# Patient Record
Sex: FEMALE | Race: WHITE
[De-identification: names, ages, dates, MRNs, and addresses within clinical notes are randomized per-mention and may not be internally consistent; named-entity substitution may affect disease eponyms.]

---

## 2017-01-20 ENCOUNTER — HOSPITAL ENCOUNTER (OUTPATIENT)
Dept: HOSPITAL 35 - CAT | Age: 57
End: 2017-01-20
Attending: FAMILY MEDICINE
Payer: COMMERCIAL

## 2017-01-20 DIAGNOSIS — R41.0: Primary | ICD-10-CM

## 2018-01-17 ENCOUNTER — HOSPITAL ENCOUNTER (OUTPATIENT)
Dept: HOSPITAL 35 - RAD | Age: 58
End: 2018-01-17
Attending: FAMILY MEDICINE
Payer: COMMERCIAL

## 2018-01-17 DIAGNOSIS — R05: Primary | ICD-10-CM

## 2018-03-01 ENCOUNTER — HOSPITAL ENCOUNTER (INPATIENT)
Dept: HOSPITAL 96 - M.ERS | Age: 58
LOS: 8 days | Discharge: HOME | DRG: 871 | End: 2018-03-09
Attending: INTERNAL MEDICINE | Admitting: INTERNAL MEDICINE
Payer: COMMERCIAL

## 2018-03-01 VITALS — HEIGHT: 62.99 IN | BODY MASS INDEX: 25.23 KG/M2 | WEIGHT: 142.4 LBS

## 2018-03-01 VITALS — SYSTOLIC BLOOD PRESSURE: 96 MMHG | DIASTOLIC BLOOD PRESSURE: 69 MMHG

## 2018-03-01 VITALS — DIASTOLIC BLOOD PRESSURE: 69 MMHG | SYSTOLIC BLOOD PRESSURE: 117 MMHG

## 2018-03-01 VITALS — DIASTOLIC BLOOD PRESSURE: 77 MMHG | SYSTOLIC BLOOD PRESSURE: 114 MMHG

## 2018-03-01 DIAGNOSIS — Z23: ICD-10-CM

## 2018-03-01 DIAGNOSIS — J44.0: ICD-10-CM

## 2018-03-01 DIAGNOSIS — R91.8: ICD-10-CM

## 2018-03-01 DIAGNOSIS — J96.01: ICD-10-CM

## 2018-03-01 DIAGNOSIS — J44.1: ICD-10-CM

## 2018-03-01 DIAGNOSIS — A41.9: Primary | ICD-10-CM

## 2018-03-01 DIAGNOSIS — Z98.891: ICD-10-CM

## 2018-03-01 DIAGNOSIS — Z88.0: ICD-10-CM

## 2018-03-01 DIAGNOSIS — E46: ICD-10-CM

## 2018-03-01 DIAGNOSIS — Z99.81: ICD-10-CM

## 2018-03-01 DIAGNOSIS — J18.9: ICD-10-CM

## 2018-03-01 DIAGNOSIS — F32.9: ICD-10-CM

## 2018-03-01 DIAGNOSIS — Z87.891: ICD-10-CM

## 2018-03-01 DIAGNOSIS — J10.00: ICD-10-CM

## 2018-03-01 LAB
ABSOLUTE MONOCYTES: 0.8 THOU/UL (ref 0–1.2)
ALBUMIN SERPL-MCNC: 2.9 G/DL (ref 3.4–5)
ALP SERPL-CCNC: 101 U/L (ref 46–116)
ALT SERPL-CCNC: 16 U/L (ref 30–65)
ANION GAP SERPL CALC-SCNC: 11 MMOL/L (ref 7–16)
AST SERPL-CCNC: 13 U/L (ref 15–37)
BE: -2.8 MMOL/L
BILIRUB SERPL-MCNC: 0.3 MG/DL
BILIRUB UR-MCNC: NEGATIVE MG/DL
BUN SERPL-MCNC: 31 MG/DL (ref 7–18)
CALCIUM SERPL-MCNC: 9.6 MG/DL (ref 8.5–10.1)
CHLORIDE SERPL-SCNC: 98 MMOL/L (ref 98–107)
CO2 SERPL-SCNC: 28 MMOL/L (ref 21–32)
COLOR UR: YELLOW
CREAT SERPL-MCNC: 1.3 MG/DL (ref 0.6–1.3)
GLUCOSE SERPL-MCNC: 125 MG/DL (ref 70–99)
GRANULOCYTES NFR BLD MANUAL: 92 %
HCO3 BLD-SCNC: 23 MMOL/L (ref 22–26)
HCT VFR BLD CALC: 39.3 % (ref 37–47)
HGB BLD-MCNC: 12.8 GM/DL (ref 12–15)
KETONES UR STRIP-MCNC: NEGATIVE MG/DL
LYMPHOCYTES # BLD: 0.3 THOU/UL (ref 0.8–5.3)
LYMPHOCYTES NFR BLD AUTO: 2 %
MCH RBC QN AUTO: 30.7 PG (ref 26–34)
MCHC RBC AUTO-ENTMCNC: 32.5 G/DL (ref 28–37)
MCV RBC: 94.3 FL (ref 80–100)
MONOCYTES NFR BLD: 5 %
MPV: 7.5 FL. (ref 7.2–11.1)
NEUTROPHILS # BLD: 14.5 THOU/UL (ref 1.6–8.1)
NEUTS BAND NFR BLD: 1 %
NT-PRO BRAIN NAT PEPTIDE: 1233 PG/ML (ref ?–300)
NUCLEATED RBCS: 0 /100WBC
PCO2 BLD: 44.1 MMHG (ref 35–45)
PLATELET # BLD EST: ADEQUATE 10*3/UL
PLATELET COUNT*: 402 THOU/UL (ref 150–400)
PO2 BLD: 84 MMHG (ref 75–100)
POTASSIUM SERPL-SCNC: 4.6 MMOL/L (ref 3.5–5.1)
PROT SERPL-MCNC: 8.1 G/DL (ref 6.4–8.2)
PROT UR QL STRIP: NEGATIVE
RBC # BLD AUTO: 4.17 MIL/UL (ref 4.2–5)
RBC # UR STRIP: NEGATIVE /UL
RBC MORPH BLD: NORMAL
RDW-CV: 14.5 % (ref 10.5–14.5)
SODIUM SERPL-SCNC: 137 MMOL/L (ref 136–145)
SP GR UR STRIP: 1.02 (ref 1–1.03)
TROPONIN-I LEVEL: 0.36 NG/ML (ref ?–0.06)
URINE CLARITY: CLEAR
URINE GLUCOSE-RANDOM: NEGATIVE
URINE LEUKOCYTES-REFLEX: NEGATIVE
URINE NITRITE-REFLEX: NEGATIVE
UROBILINOGEN UR STRIP-ACNC: 0.2 E.U./DL (ref 0.2–1)
WBC # BLD AUTO: 15.6 THOU/UL (ref 4–11)

## 2018-03-02 VITALS — DIASTOLIC BLOOD PRESSURE: 73 MMHG | SYSTOLIC BLOOD PRESSURE: 105 MMHG

## 2018-03-02 VITALS — SYSTOLIC BLOOD PRESSURE: 118 MMHG | DIASTOLIC BLOOD PRESSURE: 67 MMHG

## 2018-03-02 VITALS — SYSTOLIC BLOOD PRESSURE: 101 MMHG | DIASTOLIC BLOOD PRESSURE: 68 MMHG

## 2018-03-02 VITALS — DIASTOLIC BLOOD PRESSURE: 59 MMHG | SYSTOLIC BLOOD PRESSURE: 85 MMHG

## 2018-03-02 VITALS — DIASTOLIC BLOOD PRESSURE: 60 MMHG | SYSTOLIC BLOOD PRESSURE: 90 MMHG

## 2018-03-02 VITALS — SYSTOLIC BLOOD PRESSURE: 103 MMHG | DIASTOLIC BLOOD PRESSURE: 60 MMHG

## 2018-03-02 LAB
ABSOLUTE BASOPHILS: 0 THOU/UL (ref 0–0.2)
ABSOLUTE EOSINOPHILS: 0 THOU/UL (ref 0–0.7)
ABSOLUTE MONOCYTES: 0.4 THOU/UL (ref 0–1.2)
ANION GAP SERPL CALC-SCNC: 9 MMOL/L (ref 7–16)
BASOPHILS NFR BLD AUTO: 0.1 %
BUN SERPL-MCNC: 35 MG/DL (ref 7–18)
CALCIUM SERPL-MCNC: 9.5 MG/DL (ref 8.5–10.1)
CHLORIDE SERPL-SCNC: 98 MMOL/L (ref 98–107)
CO2 SERPL-SCNC: 30 MMOL/L (ref 21–32)
CREAT SERPL-MCNC: 1.3 MG/DL (ref 0.6–1.3)
EOSINOPHIL NFR BLD: 0 %
GLUCOSE SERPL-MCNC: 171 MG/DL (ref 70–99)
GRANULOCYTES NFR BLD MANUAL: 94.3 %
HCT VFR BLD CALC: 37.2 % (ref 37–47)
HGB BLD-MCNC: 11.8 GM/DL (ref 12–15)
LYMPHOCYTES # BLD: 0.3 THOU/UL (ref 0.8–5.3)
LYMPHOCYTES NFR BLD AUTO: 2.3 %
MCH RBC QN AUTO: 30.2 PG (ref 26–34)
MCHC RBC AUTO-ENTMCNC: 31.8 G/DL (ref 28–37)
MCV RBC: 94.8 FL (ref 80–100)
MONOCYTES NFR BLD: 3.3 %
MPV: 7.8 FL. (ref 7.2–11.1)
NEUTROPHILS # BLD: 10.8 THOU/UL (ref 1.6–8.1)
NT-PRO BRAIN NAT PEPTIDE: 1537 PG/ML (ref ?–300)
NUCLEATED RBCS: 0 /100WBC
PLATELET COUNT*: 336 THOU/UL (ref 150–400)
POTASSIUM SERPL-SCNC: 4.4 MMOL/L (ref 3.5–5.1)
RBC # BLD AUTO: 3.92 MIL/UL (ref 4.2–5)
RDW-CV: 14.5 % (ref 10.5–14.5)
SODIUM SERPL-SCNC: 137 MMOL/L (ref 136–145)
WBC # BLD AUTO: 11.4 THOU/UL (ref 4–11)

## 2018-03-02 PROCEDURE — B24BZZ4 ULTRASONOGRAPHY OF HEART WITH AORTA, TRANSESOPHAGEAL: ICD-10-PCS | Performed by: INTERNAL MEDICINE

## 2018-03-02 NOTE — 2DMMODE
[place] [address]
Phone:  [phone] 2 D/M-MODE ECHOCARDIOGRAM     
_______________________________________________________________________________
 
Name:         DC WEINBERG                  Room:          [address] [place] 
[facility]#:    [id number]     Account #:     [id number]  
Admission:    18    Attend Phys:   Umair Hamilton, 
Discharge:                Date of Birth: [birth year]  
Date of Service: 18 1700  Report #:      [id number]
        [id number]
_______________________________________________________________________________
THIS REPORT FOR:  //name//                      
 
 
--------------- APPROVED REPORT --------------
 
 
Study performed:  2018 16:28:34
 
EXAM: Comprehensive 2D, Doppler, and color-flow 
Echocardiogram 
Patient Location: In-Patient   
Room #:  [facility]     Status:  routine
 
      BSA:         1.54
HR: 77 bpm BP:          105/73 mmHg 
Rhythm: NSR     
 
Other Information 
Study Quality: Good
 
Indications
COPD
Elevated Troponin
 
2D Dimensions
   LVEF(%):  67.11 (&gt;50%)
IVSd:  10.16 (7-11mm) LVOT Diam:  19.53 (18-24mm) 
LVDd:  43.25 mm  
PWd:  9.52 (7-11mm) Ascending Ao:  28.89 (22-36mm)
LVDs:  27.27 (25-40mm) 
Aortic Root:  28.05 mm 
   [place]'s LVEF:  67.11 %
 
Volumes
Left Atrial Volume (Systole) 
    LA ESV Index:  25.20 mL/m2
 
Aortic Valve
AoV Peak Ujdah.:  1.42 m/s 
AO Peak Gr.:  8.11 mmHg  LVOT Max P.36 mmHg
AO Mean Gr.:  4.52 mmHg  LVOT Mean P.50 mmHg
    LVOT Max V:  1.16 m/s
AO V2 VTI:  20.79 cm  LVOT Mean V:  0.72 m/s
ALECIA (VTI):  2.77 cm2  LVOT V1 VTI:  19.20 cm
 
Mitral Valve
 
 
[place] [address]
Phone:  [phone]                     2 D/M-MODE ECHOCARDIOGRAM     
_______________________________________________________________________________
 
Name:         DC WEINBERG                  Room:          [address] [place] 
.R.#:    [id number]     Account #:     [id number]  
Admission:    18    Attend Phys:   Umair Hamilton, 
Discharge:                Date of Birth: [birth year]  
Date of Service: 18 1700  Report #:      [id number]
        [id number]
_______________________________________________________________________________
    E/A Ratio:  0.78
    MV Decel. Time:  215.48 ms
MV E Max Judah.:  0.74 m/s 
MV PHT:  62.49 ms  
MVA (PHT):  3.52 cm2  
 
TDI
E/Lateral E':  7.40 E/Medial E':  6.73
   Medial E' Judah.:  0.11 m/s
   Lateral E' Judah.:  0.10 m/s
 
Pulmonary Valve
PV Peak Judah.:  1.21 m/s PV Peak Gr.:  5.89 mmHg
 
Left Ventricle
The left ventricle is normal size. There is normal LV segmental wall 
motion. There is normal left ventricular wall thickness. Left 
ventricular systolic function is normal. The left ventricular 
ejection fraction is within the normal range. LVEF is 55-60%. Grade I 
- abnormal relaxation pattern.
 
Right Ventricle
The right ventricle is normal size. The right ventricular systolic 
function is normal.
 
Atria
The left atrium size is normal. The right atrium size is 
normal.
 
Aortic Valve
The aortic valve is normal in structure. No aortic regurgitation is 
present. There is no aortic valvular stenosis.
 
Mitral Valve
The mitral valve is normal in structure. There is no mitral valve 
regurgitation noted. No evidence of mitral valve stenosis.
 
Tricuspid Valve
The tricuspid valve is normal in structure. Unable to assess PA 
pressure. Trace tricuspid regurgitation.
 
Pulmonic Valve
The pulmonary valve is normal in structure. There is no pulmonic 
valvular regurgitation.
 
[facility]
 
 
[place] [address]
Phone:  [phone]                     2 D/M-MODE ECHOCARDIOGRAM     
_______________________________________________________________________________
 
Name:         DC WEINBERG                  Room:          [address]#:    [id number]     Account #:     [id number]  
Admission:    18    Attend Phys:   Umair Hamilton, 
Discharge:                Date of Birth: [birth year]  
Date of Service: 18 1700  Report #:      [id number]
        [id number]
_______________________________________________________________________________
The aortic root is normal in size. IVC is normal in size and 
collapses with &gt;50% inspiration
 
Pericardium
There is no pericardial effusion.
 
&lt;Conclusion&gt;
The left ventricle is normal size.
There is normal left ventricular wall thickness.
Left ventricular systolic function is normal.
The left ventricular ejection fraction is within the normal 
range.
LVEF is 55-60%.
Grade I - abnormal relaxation pattern.
The right ventricle is normal size.
The left atrium size is normal.
The aortic valve is normal in structure.
The mitral valve is normal in structure.
The tricuspid valve is normal in structure.
IVC is normal in size and collapses with &gt;50% inspiration
There is no pericardial effusion.
There is normal LV segmental wall motion.
 
 
 
 
 
 
 
 
 
 
 
 
 
 
 
 
 
 
 
 
 
 
  <ELECTRONICALLY SIGNED>
                                           By: Isaac Hoffmann MD, FACC     
  18
D: 18   _____________________________________
T: 18   Isaac Hoffmann MD, FACC       /INF

## 2018-03-02 NOTE — EKG
[place] [address]
Phone:  [phone]                     ELECTROCARDIOGRAM REPORT      
_______________________________________________________________________________
 
Name:       DC WEINBERG                   Room:           [address]    ADM [place]  
M.R.#:  [id number]      Account #:      [id number]  
Admission:  18     Attend Phys:    Umair Hamilton MD 
Discharge:               Date of Birth:  [birth year]  
         Report #: [id number]
    [id number]
_______________________________________________________________________________
THIS REPORT FOR:  //name//                      
 
                         [facility] ED
                                       
Test Date:    2018               Test Time:    16:35:17
Pat Name:     DC WEINBERG             Department:   
Patient ID:   [id number]            Room:         [facility]
Gender:       F                        Technician:   KRISTEL STEVENS
:          1960               Requested By: Stephan Zhang
Order Number: [id number] MD:   Isaac Hoffmann
                                 Measurements
Intervals                              Axis          
Rate:         99                       P:            80
PA:           120                      QRS:          -46
QRSD:         81                       T:            75
QT:           330                                    
QTc:          424                                    
                           Interpretive Statements
Sinus rhythm
Left anterior fascicular block
No previous ECG available for comparison
 
Electronically Signed On 3-2-2018 17:23:39 CST by Isaac Hoffmann
[email or web address]
 
 
 
 
 
 
 
 
 
 
 
 
 
 
 
 
 
 
 
  <ELECTRONICALLY SIGNED>
                                           By: Isaac Hoffmann MD, [facility]     
  18     1723
D: 18 1635   _____________________________________
T: 18 163   Isaac Hoffmann MD, FACC       /EPI

## 2018-03-03 VITALS — DIASTOLIC BLOOD PRESSURE: 75 MMHG | SYSTOLIC BLOOD PRESSURE: 108 MMHG

## 2018-03-03 VITALS — SYSTOLIC BLOOD PRESSURE: 117 MMHG | DIASTOLIC BLOOD PRESSURE: 63 MMHG

## 2018-03-03 VITALS — DIASTOLIC BLOOD PRESSURE: 60 MMHG | SYSTOLIC BLOOD PRESSURE: 92 MMHG

## 2018-03-03 VITALS — SYSTOLIC BLOOD PRESSURE: 101 MMHG | DIASTOLIC BLOOD PRESSURE: 65 MMHG

## 2018-03-03 LAB
ABSOLUTE BASOPHILS: 0 THOU/UL (ref 0–0.2)
ABSOLUTE EOSINOPHILS: 0 THOU/UL (ref 0–0.7)
ABSOLUTE MONOCYTES: 0.7 THOU/UL (ref 0–1.2)
ALBUMIN SERPL-MCNC: 2.6 G/DL (ref 3.4–5)
ALP SERPL-CCNC: 94 U/L (ref 46–116)
ALT SERPL-CCNC: 21 U/L (ref 30–65)
ANION GAP SERPL CALC-SCNC: 8 MMOL/L (ref 7–16)
AST SERPL-CCNC: 18 U/L (ref 15–37)
BASOPHILS NFR BLD AUTO: 0.2 %
BILIRUB SERPL-MCNC: 0.2 MG/DL
BUN SERPL-MCNC: 28 MG/DL (ref 7–18)
CALCIUM SERPL-MCNC: 9.7 MG/DL (ref 8.5–10.1)
CHLORIDE SERPL-SCNC: 102 MMOL/L (ref 98–107)
CO2 SERPL-SCNC: 30 MMOL/L (ref 21–32)
CREAT SERPL-MCNC: 0.9 MG/DL (ref 0.6–1.3)
EOSINOPHIL NFR BLD: 0.1 %
GLUCOSE SERPL-MCNC: 131 MG/DL (ref 70–99)
GRANULOCYTES NFR BLD MANUAL: 90.7 %
HCT VFR BLD CALC: 34.2 % (ref 37–47)
HGB BLD-MCNC: 11.1 GM/DL (ref 12–15)
LYMPHOCYTES # BLD: 0.5 THOU/UL (ref 0.8–5.3)
LYMPHOCYTES NFR BLD AUTO: 4 %
MCH RBC QN AUTO: 30.6 PG (ref 26–34)
MCHC RBC AUTO-ENTMCNC: 32.4 G/DL (ref 28–37)
MCV RBC: 94.3 FL (ref 80–100)
MONOCYTES NFR BLD: 5 %
MPV: 7.7 FL. (ref 7.2–11.1)
NEUTROPHILS # BLD: 12.2 THOU/UL (ref 1.6–8.1)
NUCLEATED RBCS: 0 /100WBC
PLATELET COUNT*: 329 THOU/UL (ref 150–400)
POTASSIUM SERPL-SCNC: 4.4 MMOL/L (ref 3.5–5.1)
PROT SERPL-MCNC: 6.3 G/DL (ref 6.4–8.2)
RBC # BLD AUTO: 3.62 MIL/UL (ref 4.2–5)
RDW-CV: 14.4 % (ref 10.5–14.5)
SODIUM SERPL-SCNC: 140 MMOL/L (ref 136–145)
TROPONIN-I LEVEL: <0.06 NG/ML (ref ?–0.06)
WBC # BLD AUTO: 13.4 THOU/UL (ref 4–11)

## 2018-03-04 VITALS — SYSTOLIC BLOOD PRESSURE: 111 MMHG | DIASTOLIC BLOOD PRESSURE: 76 MMHG

## 2018-03-04 VITALS — SYSTOLIC BLOOD PRESSURE: 110 MMHG | DIASTOLIC BLOOD PRESSURE: 67 MMHG

## 2018-03-04 VITALS — SYSTOLIC BLOOD PRESSURE: 129 MMHG | DIASTOLIC BLOOD PRESSURE: 81 MMHG

## 2018-03-04 VITALS — SYSTOLIC BLOOD PRESSURE: 110 MMHG | DIASTOLIC BLOOD PRESSURE: 61 MMHG

## 2018-03-04 VITALS — SYSTOLIC BLOOD PRESSURE: 117 MMHG | DIASTOLIC BLOOD PRESSURE: 79 MMHG

## 2018-03-04 LAB
ABSOLUTE BASOPHILS: 0 THOU/UL (ref 0–0.2)
ABSOLUTE EOSINOPHILS: 0 THOU/UL (ref 0–0.7)
ABSOLUTE MONOCYTES: 0.7 THOU/UL (ref 0–1.2)
ALBUMIN SERPL-MCNC: 2.4 G/DL (ref 3.4–5)
ALP SERPL-CCNC: 89 U/L (ref 46–116)
ALT SERPL-CCNC: 25 U/L (ref 30–65)
ANION GAP SERPL CALC-SCNC: 10 MMOL/L (ref 7–16)
AST SERPL-CCNC: 19 U/L (ref 15–37)
BASOPHILS NFR BLD AUTO: 0.2 %
BILIRUB SERPL-MCNC: 0.2 MG/DL
BUN SERPL-MCNC: 21 MG/DL (ref 7–18)
CALCIUM SERPL-MCNC: 9.4 MG/DL (ref 8.5–10.1)
CHLORIDE SERPL-SCNC: 101 MMOL/L (ref 98–107)
CO2 SERPL-SCNC: 31 MMOL/L (ref 21–32)
CREAT SERPL-MCNC: 0.9 MG/DL (ref 0.6–1.3)
EOSINOPHIL NFR BLD: 0 %
GLUCOSE SERPL-MCNC: 141 MG/DL (ref 70–99)
GRANULOCYTES NFR BLD MANUAL: 89.6 %
HCT VFR BLD CALC: 34.1 % (ref 37–47)
HGB BLD-MCNC: 11 GM/DL (ref 12–15)
LYMPHOCYTES # BLD: 0.5 THOU/UL (ref 0.8–5.3)
LYMPHOCYTES NFR BLD AUTO: 4.5 %
MCH RBC QN AUTO: 30.3 PG (ref 26–34)
MCHC RBC AUTO-ENTMCNC: 32.4 G/DL (ref 28–37)
MCV RBC: 93.5 FL (ref 80–100)
MONOCYTES NFR BLD: 5.7 %
MPV: 7.4 FL. (ref 7.2–11.1)
NEUTROPHILS # BLD: 10.4 THOU/UL (ref 1.6–8.1)
NUCLEATED RBCS: 0 /100WBC
PLATELET COUNT*: 328 THOU/UL (ref 150–400)
POTASSIUM SERPL-SCNC: 4.3 MMOL/L (ref 3.5–5.1)
PROT SERPL-MCNC: 6.7 G/DL (ref 6.4–8.2)
RBC # BLD AUTO: 3.65 MIL/UL (ref 4.2–5)
RDW-CV: 14.4 % (ref 10.5–14.5)
SODIUM SERPL-SCNC: 142 MMOL/L (ref 136–145)
WBC # BLD AUTO: 11.7 THOU/UL (ref 4–11)

## 2018-03-05 VITALS — DIASTOLIC BLOOD PRESSURE: 83 MMHG | SYSTOLIC BLOOD PRESSURE: 135 MMHG

## 2018-03-05 VITALS — DIASTOLIC BLOOD PRESSURE: 77 MMHG | SYSTOLIC BLOOD PRESSURE: 125 MMHG

## 2018-03-05 VITALS — SYSTOLIC BLOOD PRESSURE: 126 MMHG | DIASTOLIC BLOOD PRESSURE: 72 MMHG

## 2018-03-05 LAB
ABSOLUTE MONOCYTES: 0.2 THOU/UL (ref 0–1.2)
ALBUMIN SERPL-MCNC: 2.3 G/DL (ref 3.4–5)
ALP SERPL-CCNC: 88 U/L (ref 46–116)
ALT SERPL-CCNC: 59 U/L (ref 30–65)
ANION GAP SERPL CALC-SCNC: 3 MMOL/L (ref 7–16)
ANISOCYTOSIS BLD QL SMEAR: (no result)
AST SERPL-CCNC: 40 U/L (ref 15–37)
BILIRUB SERPL-MCNC: 0.1 MG/DL
BUN SERPL-MCNC: 23 MG/DL (ref 7–18)
CALCIUM SERPL-MCNC: 9 MG/DL (ref 8.5–10.1)
CHLORIDE SERPL-SCNC: 106 MMOL/L (ref 98–107)
CO2 SERPL-SCNC: 33 MMOL/L (ref 21–32)
CREAT SERPL-MCNC: 0.8 MG/DL (ref 0.6–1.3)
GLUCOSE SERPL-MCNC: 134 MG/DL (ref 70–99)
GRANULOCYTES NFR BLD MANUAL: 83 %
HCT VFR BLD CALC: 33.5 % (ref 37–47)
HGB BLD-MCNC: 10.9 GM/DL (ref 12–15)
LYMPHOCYTES # BLD: 1.4 THOU/UL (ref 0.8–5.3)
LYMPHOCYTES NFR BLD AUTO: 13 %
MCH RBC QN AUTO: 30.3 PG (ref 26–34)
MCHC RBC AUTO-ENTMCNC: 32.7 G/DL (ref 28–37)
MCV RBC: 92.7 FL (ref 80–100)
MONOCYTES NFR BLD: 2 %
MPV: 7.2 FL. (ref 7.2–11.1)
NEUTROPHILS # BLD: 9.4 THOU/UL (ref 1.6–8.1)
NEUTS BAND NFR BLD: 2 %
NUCLEATED RBCS: 0 /100WBC
PLATELET # BLD EST: ADEQUATE 10*3/UL
PLATELET COUNT*: 306 THOU/UL (ref 150–400)
POIKILOCYTOSIS BLD QL SMEAR: (no result)
POTASSIUM SERPL-SCNC: 4.4 MMOL/L (ref 3.5–5.1)
PROT SERPL-MCNC: 6.1 G/DL (ref 6.4–8.2)
RBC # BLD AUTO: 3.61 MIL/UL (ref 4.2–5)
RDW-CV: 14.7 % (ref 10.5–14.5)
SODIUM SERPL-SCNC: 142 MMOL/L (ref 136–145)
WBC # BLD AUTO: 11.1 THOU/UL (ref 4–11)

## 2018-03-05 PROCEDURE — 5A09457 ASSISTANCE WITH RESPIRATORY VENTILATION, 24-96 CONSECUTIVE HOURS, CONTINUOUS POSITIVE AIRWAY PRESSURE: ICD-10-PCS | Performed by: INTERNAL MEDICINE

## 2018-03-05 NOTE — CON
[address]  [address]                    CONSULTATION                  
_______________________________________________________________________________
 
Name:       DC WEINBERG                   Room:           [address] [place]  
M.R.#:  [id number]      Account #:      [id number]  
Admission:  18     Attend Phys:    Umair Hamilton MD 
Discharge:               Date of Birth:  [birth year]  
         Report #: [id number]
                                                                     [id number]  
_______________________________________________________________________________
THIS REPORT FOR:  //name//                      
 
CC: Umair Santoro
 
DATE OF SERVICE:  2018
 
 
CONSULT REQUESTED BY:  Dr. Zheng.
 
INDICATION FOR CONSULTATION:  Lung nodule.  The patient also has a history of
COPD exacerbation and pulmonary infiltrates.
 
HISTORY OF PRESENT ILLNESS:  A 57-year-old female with known history of COPD,
not on oxygen, not on prednisone long term, presented with increased shortness
of breath, cough, thick greenish sputum, which has now turned yellow.  There is
also elevation in troponin I up to 0.36 initially.  The patient since then has
been treated with Levaquin as well as Solu-Medrol.  Symptomatically, she is
better, but still has significant shortness of breath, still has thick mucus. 
She had some vague chest discomfort with respiration, which is better.  Answered
to the negative for all other questions asked for review of systems.
 
PAST MEDICAL HISTORY:  COPD, previous PFTs not available, depression. She has
had a heavy alcohol use in the past, , left hand surgery.  The patient
had a recent echocardiogram, shows a left ventricular ejection fraction of
55-60%.  The pulmonary artery systolic was not reported to be elevated.
 
SOCIAL HISTORY:  Smoker, extensive history, discontinued 2 years ago.  Also,
previous history of heavy alcohol use.  Unable to quantify the time.  No known
history of illegal drug use.
 
CURRENT MEDICATIONS:  List in [facility] Reviewed.
 
ALLERGIES:  PENICILLINS.
 
PHYSICAL EXAMINATION:
GENERAL:  Alert, awake and oriented.
VITAL SIGNS:  Vitals in the records reviewed.
THROAT:  No erythema.
NECK:  Does not show raised JVP.
CHEST:  Breath sounds are bilaterally equal, decreased, expirations prolonged. 
No added sounds.
HEART:  Regular, no murmur.
ABDOMEN:  Soft and nontender.
EXTREMITIES:  Lower extremities show no edema, no calf tenderness.
 
 
 
 
[place] [address]                    CONSULTATION                  
_______________________________________________________________________________
 
Name:       DC WEINBERG                   Room:           [address] [place]  
[facility]#:  [id number]      Account #:      [id number]  
Admission:  18     Attend Phys:    Umair Hamilton MD 
Discharge:               Date of Birth:  [birth year]  
         Report #: [id number]
                                                                     [id number]  
_______________________________________________________________________________
LABORATORY DATA:  The patient had a CT chest, which was performed on the .  I
reviewed the films as well as report.  In fact, I did call and talk to the
radiologist as well.  The patient's lab work is also in [facility] and is
reviewed.
 
ASSESSMENT AND PLAN:
1.  Acute hypoxemic respiratory failure, primarily secondary to chronic
obstructive pulmonary disease exacerbation and H-flu pneumonia.
2.  Chronic obstructive pulmonary disease exacerbation.  I agree with current
therapy.  We would recommend keeping the current dose of Solu-Medrol, especially
if I will be adding Mucomyst p.r.n. as mentioned below.  Reassess tomorrow.  I
did change the frequency of DuoNebs.
3.  H-flu pneumonia, on Levaquin.  Agree, continue.
4.  Pulmonary infiltrates/lung nodules.  I do not see any discrete nodules on
the CT chest.  There are some infiltrates that do look nodular though.  I did
call and discuss with the radiologist who advised that there is at least one
subcentimeter lung nodule present.  To me, it appears more likely that this is
part of an infiltrate.  Regardless, considering that the patient has had
significant recent weight loss, I do feel that it will be prudent to do a
followup CT.  I recommend treating her with Levaquin as mentioned above and then
obtaining a followup CT in about 6 weeks.  Should this nodule fail to resolve,
then a PET scan could be considered later.
5.  Thick mucus production.  We will add p.r.n. Mucomyst.
 
Thanks for this consultation.
 
 
 
 
 
 
 
 
 
 
 
 
 
 
 
 
 
 
 
<ELECTRONICALLY SIGNED>
                                        By:  Yoan Sanchez MD              
18     1509
D: 18 1210_______________________________________
T: 18 1229Aevon Sanchez MD                 /nt

## 2018-03-05 NOTE — CARDNUC
[place] [address]
Phone:  [phone]                     CARDIAC NUCLEAR IMAGING REPORT
_______________________________________________________________________________
 
Name:         DC WEINBERG                  Room:          [address] [place] 
[facility]#:    [id number]     Account #:     [id number]  
Admission:    03/01/18    Attend Phys:   Umair Hamilton, 
Discharge:                Date of Birth: [birth year]  
Date of Service: 03/05/18 1349  Report #:      [id number]
        [id number] 
_______________________________________________________________________________
THIS REPORT FOR:  //name//                      
 
 
--------------- APPROVED REPORT --------------
 
 
Exam: Nuclear Stress Test
Indication: Dyspnea, Troponin elevation
Patient Location: In-Patient
Room #: 212
Stress Tech: Azul Da Silva
Stress Nurse: Rochelle Tao RN
 
Ht: 5 ft 3 in  Wt: 118 lbs  BSA:  1.54 m2
    BMI:  20.9
 
Medical History
Medical History: copd
Allergies: penicillin
Previous Cardiac Procedures: none
Exercise History: Sedentary
 
NM EXAM: Myocardial Perfusion REST/STRESS
Imaging Protocol: Rest Tc-99m/Stress Tc-99m 1 day
 
Resting Data
Rest SPECT myocardial perfusion imaging was performed in supine 
position 45 minutes following the intravenous injection of 11.4 mCi 
of Tc-99m Sestamibi.
Time of rest injection: 1340     Date: 03/02/2018
The images were gated to evaluate regional wall motion and calculate 
left ventricular ejection fraction. 
 
Pharmacologic Stress
Pharmacologic stress test was performed by injecting Regadenoson 0.4 
mg IV push followed by the intravenous injection of 34.5 mCi of 
Tc-99m Sestamibi.
Time of stress injection: 1520     Date: 03/02/2018
Gated Stress SPECT was performed 45 minutes after stress 
injection.
The images were gated to evaluate regional wall motion and calculate 
left ventricular ejection fraction. 
 
Study Quality
Study: Good
Artifact: No artifact 
 
 
[place] [address]
Phone:  [phone]                     CARDIAC NUCLEAR IMAGING REPORT
_______________________________________________________________________________
 
Name:         DC WEINBERG                  Room:          [address] [place] 
[facility]#:    [id number]     Account #:     [id number]  
Admission:    03/01/18    Attend Phys:   Umair Hamilton, 
Discharge:                Date of Birth: [birth year]  
Date of Service: 03/05/18 1349  Report #:      [id number]
        [id number] 
_______________________________________________________________________________
 
Study Data
At rest, the left ventricular ejection fraction was 73%..   
Post stress, the left ventricular ejection was 74%..   
TID = 1.17.       
 
Perfusion
Normal left ventricular perfusion.
 
Wall Motion
Normal left ventricular wall motion.
 
Nuclear Conclusion
ECG Findings: negative for ischemia
Clinical Findings: negative for ischemia
Nuclear Findings: negative for ischemia
Exercise Capacity: not assessed
Left Ventricular Function: normal
Risk Study: low
Myocardial perfusion images show no defect to suggest infarct or 
ischemia. Left ventricular systolic function is normal on gated 
studies. This is a low risk study. 
 
       Interpreted by:  Andriy Mike M.D. [facility]
 Electronically Approved: 03/05/2018 13:49:20 
 
Stress Test Details
Stress Test:  Pharmacologic stress was paired with low level 
exercise.      
  Reason for pharmacologic stress test: physical limitation.      
  Reversal agent Aminophyline 50 mg, given intravenously for 
headache.      
HR           
Resting HR:            97 bpm   Max Heart Rate (APMHR): 161 bpm  
Max HR Achieved:  114 bpm   Target HR (85% APMHR): 136 bpm  
% of APMHR:         70         
Recovery HR:            102 bpm        
 
BP           
Resting BP:  155/75 mmHg        
Max BP:       116/58 mmHg        
 
ECG           
Resting ECG:  Sinus Rhythm, normal EKG       
Stress ECG:     Sinus Rhythm, normal EKG      
ST Change: None          
 
 
[place] [address]
Phone:  [phone]                     CARDIAC NUCLEAR IMAGING REPORT
_______________________________________________________________________________
 
Name:         DC WEINBERG                  Room:          [address]#:    [id number]     Account #:     [id number]  
Admission:    03/01/18    Attend Phys:   Umair Hamilton, 
Discharge:                Date of Birth: [birth year]  
Date of Service: 03/05/18 1349  Report #:      [id number]
        [id number] 
_______________________________________________________________________________
Arrhythmia:    None         
Recovery ECG: Sinus Rhythm, normal EKG       
Recovery ST Change: None        
Recovery Arrhythmia: None        
 
Clinical
Reason for Termination: Completed protocol
Exercise duration: 0 min  sec
Exercise capacity: 1 METs
The patient had no significant chest discomfort with Lexiscan 
infusion.
 
Nurse Comments
pt co severe headache relieved with aminophylline and 
caffeine
 
Stress ECG Conclusion
The baseline 12-lead electrocardiogram showed normal sinus rhythm 
with no significant ST or T-wave abnormalities. EKGs obtained during 
and post Lexiscan infusion showed sinus rhythm with no significant ST 
or T wave changes when compared to baseline. There were no 
stress-induced arrhythmias.
 
&lt;Conclusion&gt;
The baseline 12-lead electrocardiogram showed normal sinus rhythm 
with no significant ST or T-wave abnormalities. EKGs obtained during 
and post Lexiscan infusion showed sinus rhythm with no significant ST 
or T wave changes when compared to baseline. There were no 
stress-induced arrhythmias.
 
 
 
 
 
 
 
 
 
 
 
 
 
 
 
  <ELECTRONICALLY SIGNED>
                                           By: Andriy Mike MD, FACC   
  03/05/18     1349
D: 03/05/18 1349   _____________________________________
T: 03/05/18 1349   Andriy Mike MD, FACC     /INF

## 2018-03-06 VITALS — DIASTOLIC BLOOD PRESSURE: 72 MMHG | SYSTOLIC BLOOD PRESSURE: 119 MMHG

## 2018-03-06 VITALS — SYSTOLIC BLOOD PRESSURE: 120 MMHG | DIASTOLIC BLOOD PRESSURE: 76 MMHG

## 2018-03-06 VITALS — DIASTOLIC BLOOD PRESSURE: 75 MMHG | SYSTOLIC BLOOD PRESSURE: 120 MMHG

## 2018-03-06 VITALS — DIASTOLIC BLOOD PRESSURE: 83 MMHG | SYSTOLIC BLOOD PRESSURE: 124 MMHG

## 2018-03-06 VITALS — SYSTOLIC BLOOD PRESSURE: 120 MMHG | DIASTOLIC BLOOD PRESSURE: 78 MMHG

## 2018-03-06 LAB
ABSOLUTE BASOPHILS: 0 THOU/UL (ref 0–0.2)
ABSOLUTE EOSINOPHILS: 0 THOU/UL (ref 0–0.7)
ABSOLUTE MONOCYTES: 0.5 THOU/UL (ref 0–1.2)
ALBUMIN SERPL-MCNC: 2.3 G/DL (ref 3.4–5)
ALP SERPL-CCNC: 88 U/L (ref 46–116)
ALT SERPL-CCNC: 45 U/L (ref 30–65)
ANION GAP SERPL CALC-SCNC: 7 MMOL/L (ref 7–16)
AST SERPL-CCNC: 16 U/L (ref 15–37)
BASOPHILS NFR BLD AUTO: 0.1 %
BILIRUB SERPL-MCNC: 0.1 MG/DL
BUN SERPL-MCNC: 21 MG/DL (ref 7–18)
CALCIUM SERPL-MCNC: 8.6 MG/DL (ref 8.5–10.1)
CHLORIDE SERPL-SCNC: 104 MMOL/L (ref 98–107)
CO2 SERPL-SCNC: 32 MMOL/L (ref 21–32)
CREAT SERPL-MCNC: 0.8 MG/DL (ref 0.6–1.3)
EOSINOPHIL NFR BLD: 0 %
GLUCOSE SERPL-MCNC: 188 MG/DL (ref 70–99)
GRANULOCYTES NFR BLD MANUAL: 91.2 %
HCT VFR BLD CALC: 34.3 % (ref 37–47)
HGB BLD-MCNC: 11.5 GM/DL (ref 12–15)
LYMPHOCYTES # BLD: 0.4 THOU/UL (ref 0.8–5.3)
LYMPHOCYTES NFR BLD AUTO: 3.9 %
MCH RBC QN AUTO: 31.5 PG (ref 26–34)
MCHC RBC AUTO-ENTMCNC: 33.4 G/DL (ref 28–37)
MCV RBC: 94.2 FL (ref 80–100)
MONOCYTES NFR BLD: 4.8 %
MPV: 7.1 FL. (ref 7.2–11.1)
NEUTROPHILS # BLD: 9.9 THOU/UL (ref 1.6–8.1)
NUCLEATED RBCS: 0 /100WBC
PLATELET COUNT*: 299 THOU/UL (ref 150–400)
POTASSIUM SERPL-SCNC: 4.1 MMOL/L (ref 3.5–5.1)
PROT SERPL-MCNC: 5.9 G/DL (ref 6.4–8.2)
RBC # BLD AUTO: 3.64 MIL/UL (ref 4.2–5)
RDW-CV: 14.6 % (ref 10.5–14.5)
SODIUM SERPL-SCNC: 143 MMOL/L (ref 136–145)
WBC # BLD AUTO: 10.8 THOU/UL (ref 4–11)

## 2018-03-07 VITALS — DIASTOLIC BLOOD PRESSURE: 88 MMHG | SYSTOLIC BLOOD PRESSURE: 123 MMHG

## 2018-03-07 VITALS — DIASTOLIC BLOOD PRESSURE: 80 MMHG | SYSTOLIC BLOOD PRESSURE: 127 MMHG

## 2018-03-07 LAB
ABSOLUTE BASOPHILS: 0 THOU/UL (ref 0–0.2)
ABSOLUTE EOSINOPHILS: 0 THOU/UL (ref 0–0.7)
ABSOLUTE MONOCYTES: 0.4 THOU/UL (ref 0–1.2)
ALBUMIN SERPL-MCNC: 2.2 G/DL (ref 3.4–5)
ALP SERPL-CCNC: 87 U/L (ref 46–116)
ALT SERPL-CCNC: 55 U/L (ref 30–65)
ANION GAP SERPL CALC-SCNC: 5 MMOL/L (ref 7–16)
AST SERPL-CCNC: 23 U/L (ref 15–37)
BASOPHILS NFR BLD AUTO: 0.1 %
BILIRUB SERPL-MCNC: < 0.1 MG/DL
BUN SERPL-MCNC: 21 MG/DL (ref 7–18)
CALCIUM SERPL-MCNC: 8.5 MG/DL (ref 8.5–10.1)
CHLORIDE SERPL-SCNC: 105 MMOL/L (ref 98–107)
CO2 SERPL-SCNC: 33 MMOL/L (ref 21–32)
CREAT SERPL-MCNC: 0.7 MG/DL (ref 0.6–1.3)
EOSINOPHIL NFR BLD: 0 %
GLUCOSE SERPL-MCNC: 135 MG/DL (ref 70–99)
GRANULOCYTES NFR BLD MANUAL: 92.3 %
HCT VFR BLD CALC: 32.4 % (ref 37–47)
HGB BLD-MCNC: 10.8 GM/DL (ref 12–15)
LYMPHOCYTES # BLD: 0.5 THOU/UL (ref 0.8–5.3)
LYMPHOCYTES NFR BLD AUTO: 4.1 %
MCH RBC QN AUTO: 30.8 PG (ref 26–34)
MCHC RBC AUTO-ENTMCNC: 33.2 G/DL (ref 28–37)
MCV RBC: 92.7 FL (ref 80–100)
MONOCYTES NFR BLD: 3.5 %
MPV: 7.1 FL. (ref 7.2–11.1)
NEUTROPHILS # BLD: 11.5 THOU/UL (ref 1.6–8.1)
NUCLEATED RBCS: 0 /100WBC
PLATELET COUNT*: 309 THOU/UL (ref 150–400)
POTASSIUM SERPL-SCNC: 4.1 MMOL/L (ref 3.5–5.1)
PROT SERPL-MCNC: 5.5 G/DL (ref 6.4–8.2)
RBC # BLD AUTO: 3.5 MIL/UL (ref 4.2–5)
RDW-CV: 14.6 % (ref 10.5–14.5)
SODIUM SERPL-SCNC: 143 MMOL/L (ref 136–145)
WBC # BLD AUTO: 12.5 THOU/UL (ref 4–11)

## 2018-03-08 VITALS — DIASTOLIC BLOOD PRESSURE: 76 MMHG | SYSTOLIC BLOOD PRESSURE: 110 MMHG

## 2018-03-08 VITALS — SYSTOLIC BLOOD PRESSURE: 118 MMHG | DIASTOLIC BLOOD PRESSURE: 82 MMHG

## 2018-03-08 VITALS — DIASTOLIC BLOOD PRESSURE: 86 MMHG | SYSTOLIC BLOOD PRESSURE: 119 MMHG

## 2018-03-08 VITALS — DIASTOLIC BLOOD PRESSURE: 87 MMHG | SYSTOLIC BLOOD PRESSURE: 147 MMHG

## 2018-03-09 VITALS — SYSTOLIC BLOOD PRESSURE: 126 MMHG | DIASTOLIC BLOOD PRESSURE: 84 MMHG

## 2018-03-09 VITALS — DIASTOLIC BLOOD PRESSURE: 84 MMHG | SYSTOLIC BLOOD PRESSURE: 126 MMHG

## 2018-04-27 ENCOUNTER — HOSPITAL ENCOUNTER (OUTPATIENT)
Dept: HOSPITAL 96 - M.CT | Age: 58
End: 2018-04-27
Attending: INTERNAL MEDICINE
Payer: COMMERCIAL

## 2018-04-27 DIAGNOSIS — M47.894: ICD-10-CM

## 2018-04-27 DIAGNOSIS — I25.10: Primary | ICD-10-CM

## 2018-04-27 DIAGNOSIS — R09.02: ICD-10-CM

## 2019-01-02 NOTE — NUR
Pain Clinic Assessment:
 
1. History of Osteoarthritis:
Not Applicable
   History of Rheumatoid Arthritis:
Not Applicable
 
2. Height: 5 ft. 4 in. 162.6 cm.
   Weight: 136.0 lb.  oz. 61.689 kg.
   Patient's BMI: 23.3
 
3. Vital Signs:
   BP: 124/67 Pulse: 82 Resp: 16
   Temp:  02 Sat: 92 ECG Mon:
 
4. Pain Intensity: 10
 
5. Fall Risk:
   Dizziness: N  Needs help standing or walking: N
   Fallen in the last 3 months: N
   Fall risk comments:
 
 
6. Patient on Blood Thinner: None
 
7. History of Hypertension: N
 
8. Opioid Therapy greater than 6 weeks: N
   Opiate Contract Signed:
 
9. Risk Assessment Tool Provided:
 
10. Functional Assessment Tool: 63/70
 
11. Recreational Drug Use: Never Drug Type:
    Tobacco Use: Never Smoker Tobacco Type:
       Amount or Packs/day:  How Many Years:
    Alcohol Use: Yes  Frequency: Weekly Quant: 2-3

## 2019-01-04 NOTE — HPC
[facility]
[place]   [address]                     PAIN MANAGEMENT CONSULTATION  
_______________________________________________________________________________
 
Name:       MICHELLE OLSON KELSI               Room #:                     REG [facility]..#:      [id number]                       Account #:      [id number]  
Admission:  01/04/19    Attend Phys:    LIBERTY Howard MD    
Discharge:              Date of Birth:  [birth year]  
                                                          Report #: [id number]
                                                                    [id number]   
_______________________________________________________________________________
THIS REPORT FOR:   //name//                          
 
CC: LIBERTY Sanders
 
DATE OF SERVICE:  01/04/2019
 
 
PRIMARY CARE PHYSICIAN:  Jelani Sanders MD
 
CHIEF COMPLAINT:  Pain and discomfort in the leg improved after the last
injection.  I am here for another injection.
 
HISTORY:  The patient is a 58-year-old female who has been seen in the pain
clinic because of lumbar radiculopathy.  She is having pain and discomfort,
which has been quite problematic involving the anterior portion of her leg in
the L2-L3 dermatomal distribution.  She underwent an epidural steroid injection
in the past.  She noted greater than 50% improvement in her pain.  She has
returned today for another epidural steroid injection.  Risks and benefits have
been considered by the patient and she would like to proceed with another
injection at this juncture.
 
ALLERGIES:  PENICILLIN.
 
CURRENT MEDICATIONS:  Hydrocodone 5/325 one p.o. q. 4 hours, Robaxin 7.5 mg,
Relafen 750 mg b.i.d., albuterol 2.5 mg q.i.d., ProAir 8.5 mg, DuoNeb 2.5 mg/0.5
p.r.n.
 
PAIN CLINIC ASSESSMENT/PQRS:
1.  The patient is not being treated for rheumatoid arthritis or osteoarthritis.
2.  Height 5 feet 4 inches, weight 136 pounds, BMI is 23.3.
3.  Vital signs:  Blood pressure 137/88, pulse 87, respiratory rate 16, room air
saturation 93%.
4.  Pain intensity 10/10.
5.  Fall risk.  The patient has not fallen in the last 3 months.
6.  Blood thinner.  The patient is not on a blood thinning medication.
7.  History of hypertension.  The patient is not being treated for hypertension.
8.  Opioid for greater than 6 weeks.  The patient is not using opioid
medications on a regular basis.
9.  Risk assessment tool, moderate for opioid use.
10.  Functional assessment tool 63/70.
11.  Recreational drug use.  The patient denies use of recreational drugs.
12.  Tobacco:  The patient denies use of tobacco.
13.  Alcohol:  The patient has used alcoholic beverages sometimes in excess in
the past.
 
 
 
 
[address]   [address]                     PAIN MANAGEMENT CONSULTATION  
_______________________________________________________________________________
 
Name:       MICHELLE OLSON KLESI               [facility]                     [id number]                       Account #:      [id number]  
Admission:  01/04/19    Attend Phys:    LIBERTY Howard MD    
Discharge:              Date of Birth:  [birth year]  
                                                          Report #: [id number]
                                                                    [id number]   
_______________________________________________________________________________
PHYSICAL EXAMINATION:
GENERAL:  The patient is a well-developed, well-nourished white female.  Appears
her stated age.  She is alert and oriented x 3.  Affect is appropriate.  Speech
is fluent.
HEENT:  Normocephalic, atraumatic.  Extraocular eye muscles intact.  Sclerae
nonicteric.  Mucous membranes moist.
NECK:  Without adenopathy or JVD.
LUNGS:  Clear to auscultation without rhonchi or rales.
ABDOMEN:  Nontender.  Bowel sounds present.
MUSCULOSKELETAL:  Without significant scoliosis, kyphosis or lordosis.  The
patient has some muscle weakness on the right anterior thigh area.  Notes some
pain and discomfort in the L2-L3 dermatomal distribution.  The patient has
returned to the pain clinic today for an epidural steroid injection involving
that area.
 
IMPRESSION:
1.  Lumbar radiculopathy in the L2-L3 dermatomal distribution.
2.  Depression.
3.  History of alcohol abuse.
4.  Chronic obstructive pulmonary disease.
 
RECOMMENDATIONS:  We discussed treatment options with the patient.  Risks and
benefits of an epidural steroid injection were again reviewed.  Possible
complication of the procedure, which could include but are not limited to
infection, worsening of pain, no improvement in pain and the patient elects to
proceed.
 
PROCEDURE NOTE:  The patient was assisted in getting on the examination table. 
Her back was sterilely prepped with a Betadine solution.  A pillow was placed
under her abdomen to bolster and improve positioning.  Fluoroscopy using
anterior, posterior as well as lateral viewing were implemented.  At the T2/T3
interspace 0.25% bupivacaine was infiltrated.  A 17-gauge Tuohy with loss of
resistance technique was used to gain access to the epidural space.  There was
no CSF, heme or paresthesia.  Total of 80 mg Depo-Medrol, 40 mg triamcinolone
and 2 mL of 0.25% bupivacaine using a right paracentral approach were
undertaken.  The patient's pain decreased to 1-2 at the time of discharge.  She
will follow up in the future as needed.
 
We would like to thank you for letting us participate in her care.  We hope she
continues to improve.
 
 
 
 
                         
   By:                               
                   
D: 01/04/19 1805                           _____________________________________
T: 01/05/19 0010                           LIBERTY Howard MD              /CLIFTON

## 2019-01-04 NOTE — HPC
[facility]
[address]
[place]   [address]                     PAIN MANAGEMENT CONSULTATION  
_______________________________________________________________________________
 
Name:       MICHELLE OLSON               [id number]                       Account #:      [id number]  
Admission:  01/02/19    Attend Phys:    LIBERTY Howard MD    
Discharge:              Date of Birth:  [birth year]  
                                                          Report #: [id number]
                                                                    [id number]   
_______________________________________________________________________________
THIS REPORT FOR:   //name//                          
 
CC: LIBERTY Sanders
 
DATE OF SERVICE:  01/02/2019
 
 
CHIEF COMPLAINT:  Pain with 90% improved after the last injection.
 
HISTORY OF PRESENT ILLNESS:  The patient is a 58-year-old female who has been
seen in the pain clinic because of pain and discomfort involving her right leg. 
As you may recall, the patient started experiencing pain and discomfort in
09/2018.  Because of the discomfort, she had been having difficulty with
activities of daily living.  Notes weakness in her right leg.  Walks with an
antalgic gait.  Has difficulty sleeping.  Has a perception of weakness with
numbness and tingling in the anterior portion of her right leg.  She has tried
nonsteroidal anti-inflammatory medications.  She has used MS Contin and
hydrocodone.  She has also tried Medrol Dosepak.  She underwent an epidural
steroid injection and noted a significant improvement in her pain.  It decreased
by 90%.  At this juncture, she has noted increase in her pain.  Rates it as
about a 7-8 at this point.  Continues to have some numbness and weakness.  The
patient recently had an MRI, which showed that she has a right foraminal annular
tear.  Notes that there is some effacement of the ventral L2 nerve root.  She
has returned today for an injection.  The patient states that she chatted with
an , who told her that she can get an injection today. 
We have called the insurance company and they requested a dictation prior to the
injection.
 
ALLERGIES:  THE PATIENT STATES SHE IS ALLERGIC TO PENICILLIN.
 
MEDICATIONS:  Hydrocodone 5/325 one p.o. every 4 hours p.r.n., Robaxin 750 mg
every 8 hours p.r.n. muscle spasms, Relafen 750 mg b.i.d., albuterol 2.5 mg 4
times daily, ProAir 8.5 grams, DuoNeb 2.5/0.5 mL every 4 hours p.r.n.
 
PAIN CLINIC ASSESSMENT/PQRS:
1.  History of osteoarthritis.  The patient is not being treated for
osteoarthritis or rheumatoid arthritis.
2.  Height 5 feet 4 inches, weight 130 pounds, BMI is 22.3.
3.  Vital signs:  Blood pressure 125/70, pulse 90, respiratory rate 18, room air
saturation is 95%.
4.  Pain intensity 8-9/10.
5.  Fall risk.  The patient has not fallen in the last 3 months.
6.  Blood thinner.  The patient is not on a blood thinning medication.
7.  Hypertension.  The patient is not being treated for hypertension.
8.  Opioids greater than 6 weeks:  The patient has used MS Contin and
 
 
 
[address]   [address]                     PAIN MANAGEMENT CONSULTATION  
_______________________________________________________________________________
 
Name:       MICHELLE OLSON               Room #:                     REG [facility] 
[facility]#:      [id number]                       Account #:      [id number]  
Admission:  01/02/19    Attend Phys:    LIBERTY Howard MD    
Discharge:              Date of Birth:  [birth year]  
                                                          Report #: [id number]
                                                                    [id number]   
_______________________________________________________________________________
hydrocodone to help ____ out her pain.
9.  Risk assessment tool, moderate for opioid use.
10.  Functional assessment tool 63/70.
11.  Recreational drug use.  The patient denies use of recreational drugs.
12.  Tobacco:  The patient has smoked and stopped smoking in 2016.
13.  Alcohol:  The patient states that she drinks 2-3 times weekly.
 
PHYSICAL EXAMINATION:
GENERAL:  The patient is a well-developed, well-nourished white female.  Appears
her stated age.  She is alert and oriented x 3.  Affect is bright and
appropriate.  Speech is fluent.
HEENT:  Normocephalic, atraumatic.  Extraocular eye muscles intact.  Sclerae
nonicteric.  Mucous membranes are moist.
NECK:  Without adenopathy or JVD.
LUNGS:  Clear to auscultation.
HEART:  Regular rate.  S1, S2.
ABDOMEN:  Nontender.  Bowel sounds present.
MUSCULOSKELETAL:  Without significant scoliosis, kyphosis or lordosis.  The
patient has pain and discomfort in the low back area with pain that radiates
around into the L2-L3 dermatomal distribution with sensory changes, numbness and
weakness associated with her right leg.  Walks with an antalgic gait.  Feels
that her leg might give way.  The patient does have a stabbing sensation that
she has been experiencing.  Notes that her pain improves when she lies flat on
her back.
 
IMPRESSION:
1.  Lumbar radiculopathy L2-L3 dermatomal distribution on the right.
2.  Depression.
3.  Ethanol abuse in the past.
4.  Chronic obstructive pulmonary disease.
 
MRI of the lumbar spine dated 12/21/2018:
1.  L1-L2, minimal disk bulge without central canal or neural foraminal
stenosis.  There is a right foramen to far lateral annular tear measuring one 10
mm at its widest dimension.  No significant effacement of the L1 nerve root.
2.  L2-L3, mild foraminal disk bulging bilaterally.  There is a small right
foraminal annular tear.  There is mild effacement of the ventral surface of the
L2 nerve root.
3.  L3-L4, mild posterior disk bulging causing effacement of the ventral thecal
sac.  There is mild facet hypertrophy and a trace amount of fluid in both facet
joints.  There is a tiny cyst originating from the right facet joint, which
remains deep to the ligamentum flavum.  The AP dimension of the central canal is
within normal limits of 11.5 mm.
4.  L4-L5, mild posterior disk bulge with mild effacement of the ventral thecal
sac.  No significant central canal or neural foraminal stenosis.
5.  L5-S1, disk space narrowing with posterior disk bulging.  There is
 
 
 
[address]   [address]                     PAIN MANAGEMENT CONSULTATION  
_______________________________________________________________________________
 
Name:       MICHELLE OLSON                Room #:                     REG [facility].#:      [id number]                       Account #:      [id number]  
Admission:  01/02/19    Attend Phys:    LIBERTY Howard MD    
Discharge:              Date of Birth:  [birth year]  
                                                          Report #: [id number]
                                                                    [id number]   
_______________________________________________________________________________
mild-to-moderate bilateral neural foraminal narrowing and mild effacement of the
undersurface of both exiting L5 nerve roots.
 
RECOMMENDATIONS:  We discussed treatment options with the patient.  The patient
received greater than 90% benefit after the last epidural steroid injection. 
MRI shows pathology at the L2-L3 lumbar area.  We will proceed with another
epidural steroid injection given that the patient has gleaned good relief after
the last injection.  She will continue with her current medical regimen.  Risks
and benefits of the procedure were again reviewed.  The patient would like to
proceed with another injection as soon as her insurance company okays it.  We
would like to thank you for letting us provide care.
 
 
 
 
 
 
 
 
 
 
 
 
 
 
 
 
 
 
 
 
 
 
 
 
 
 
 
 
 
 
 
 
 
  <ELECTRONICALLY SIGNED>
   By: LIBERTY Howard MD            
  01/04/19     1415
D: 01/02/19 0912                           _____________________________________
T: 01/02/19 1020                           LIBERTY Howard MD              /nt

## 2019-01-04 NOTE — NUR
Pain Clinic Assessment:
 
1. History of Osteoarthritis:
Not Applicable
   History of Rheumatoid Arthritis:
Not Applicable
 
2. Height: 5 ft. 4 in. 162.6 cm.
   Weight: 136.0 lb.  oz. 61.689 kg.
   Patient's BMI: 23.3
 
3. Vital Signs:
   BP: 137/88 Pulse: 87 Resp: 16
   Temp:  02 Sat: 93 ECG Mon:
 
4. Pain Intensity: 10
 
5. Fall Risk:
   Dizziness: N  Needs help standing or walking: N
   Fallen in the last 3 months: N
   Fall risk comments:
 
 
6. Patient on Blood Thinner: None
 
7. History of Hypertension: N
 
8. Opioid Therapy greater than 6 weeks: N
   Opiate Contract Signed:
 
9. Risk Assessment Tool Provided: 0-low
 
10. Functional Assessment Tool: 63/70
 
11. Recreational Drug Use: Never Drug Type:
    Tobacco Use: Never Smoker Tobacco Type:
       Amount or Packs/day:  How Many Years:
    Alcohol Use: Yes  Frequency:  Quant:

## 2019-02-15 NOTE — NUR
Pain Clinic Assessment:
 
1. History of Osteoarthritis:
Not Applicable
   History of Rheumatoid Arthritis:
Not Applicable
 
2. Height: 5 ft. 4 in. 162.6 cm.
   Weight: 139.8 lb.  oz. 63.413 kg.
   Patient's BMI: 24.0
 
3. Vital Signs:
   BP: 139/87 Pulse: 70 Resp: 18
   Temp:  02 Sat: 97 ECG Mon:
 
4. Pain Intensity: 10
 
5. Fall Risk:
   Dizziness: N  Needs help standing or walking: N
   Fallen in the last 3 months: N
   Fall risk comments:
 
 
6. Patient on Blood Thinner: None
 
7. History of Hypertension: N
 
8. Opioid Therapy greater than 6 weeks: N
   Opiate Contract Signed:
 
9. Risk Assessment Tool Provided: 0-low
 
10. Functional Assessment Tool: 63/70
 
11. Recreational Drug Use: Never Drug Type:
    Tobacco Use: Never Smoker Tobacco Type:
       Amount or Packs/day:  How Many Years:
    Alcohol Use: Yes  Frequency: Weekly Quant: 1-2

## 2019-02-15 NOTE — HPC
[facility]
[address]
[place]   [address]                     PAIN MANAGEMENT CONSULTATION  
_______________________________________________________________________________
 
Name:       MICHELLE OLSON KELSI               [facility]                     [facility]      [id number]                       Account #:      [id number]  
Admission:  02/15/19    Attend Phys:    LIBERTY Howard MD    
Discharge:              Date of Birth:  [birth year]  
                                                          Report #: [id number]
                                                                    [id number]   
_______________________________________________________________________________
THIS REPORT FOR:   //name//                          
 
CC: LIBERTY Sanders MD
 
DATE OF SERVICE:  02/15/2019
 
 
CHIEF COMPLAINT:  "I went to the orthopedic doctor and he said it is my hip."
 
HISTORY OF PRESENT ILLNESS:  The patient is a 58-year-old female who has been
seen in the Pain Clinic because of chronic pain involving her right anterior
thigh as well as pain, which is radiating into her right hip.  She has undergone
epidural steroid injections in the L2-L3 dermatomal distribution.  This is the
area where she has pain in the anterior portion of her thigh.  She also has a
second pain, which involves the right groin area.  She notes that with certain
movements and activities, pain in the right groin area is quite severe.  She has
noticed a worsening of pain and discomfort in the groin area that appears to be
hip related.  Denies any trauma.  The only thing she can recount, as you recall,
was jumping on a bed at a class reunion in September 2018.  She has been using
nonsteroidal anti-inflammatory medications.  She has used hydrocodone.  Pain
continues to be problematic and makes ambulation difficult.  The patient states
that she saw her orthopedic surgeon, he feels the problem is in the right hip. 
She has returned to the Pain Clinic with a desire of undergoing a right hip
injection to help quell her pain.
 
ALLERGIES:  PENICILLIN.
 
CURRENT MEDICATIONS:  Hydrocodone 5/325 one p.o. q. 4 hours, Robaxin 750 mg q. 8
hours, Relafen 750 mg b.i.d., albuterol 2.5 mg q.i.d., ProAir inhaler, DuoNeb
2.5/0.5 p.r.n.
 
PAIN CLINIC ASSESSMENT AND PQRS:
1.  The patient is not being treated for rheumatoid arthritis.  She does appear
to have some osteoarthritic changes going on in her right hip.
2.  Height 5 feet 4 inches, weight 139 pounds, BMI is 24.
3.  Blood pressure 139/87, pulse 70, respiratory rate 18, room air saturation
97%.
4.  Pain intensity:  10/10.
5.  Fall risk:  The patient has not fallen in the last 3 months.
6.  Blood thinner:  The patient is not on a blood thinning medication.
7.  Hypertension:  The patient is not being treated for hypertension.
8.  Opioid greater than 6 weeks:  The patient is not receiving opioid
medications from the Pain Clinic on a regular basis.
9.  Functional assessment tool:  63/70.
 
 
 
[place] [address]                     PAIN MANAGEMENT CONSULTATION  
_______________________________________________________________________________
 
Name:       MICHELLE OLSON KELSI               [facility]      [id number]                       Account #:      [id number]  
Admission:  02/15/19    Attend Phys:    LIBERTY Howard MD    
Discharge:              Date of Birth:  [birth year]  
                                                          Report #: [id number]
                                                                    [id number]   
_______________________________________________________________________________
10.  Recreational drug use:  The patient denies use of recreational drugs.
11.  Tobacco:  The patient is not smoking.
12.  Alcohol:  The patient denies more than occasional use of alcoholic
beverage.
 
PHYSICAL EXAMINATION:
GENERAL:  The patient is a well-developed, well-nourished, white female. 
Appears her stated age.  She is alert and oriented.  Speech is fluent.  She is
somewhat despondent and saddened that her pain continues to be problematic.  She
cries through the interview.
HEENT:  Normocephalic, atraumatic.  Extraocular eye muscles are intact.  Sclerae
nonicteric.  Mucous membranes are moist.
NECK:  Without adenopathy or JVD.
LUNGS:  Clear to auscultation without rhonchi or rales.
HEART:  Regular rate.  S1 and S2.
ABDOMEN:  Nontender.  Bowel sounds are present.
MUSCULOSKELETAL:  Without significant scoliosis, kyphosis or lordosis.  Muscle
strength in the upper extremity is judged to be 5/5 for the major muscle groups.
 The patient has some pain and discomfort in the low back area and still has
some pain and discomfort in the L2-L3 anterior portion of dermatomal portion. 
She has pain, which radiates directly into the groin area of the right hip. 
This is exacerbated with internal and external rotation of her leg.  She has
some difficulty walking as a result of this.  She walks with an antalgic gait.
 
IMPRESSION:
1.  Right hip osteoarthritic pain and changes.
2.  Depression.
3.  Lumbar radiculopathy at L2-L3 dermatomal distribution.
4.  History of alcohol abuse.
5.  Chronic obstructive pulmonary disease.
 
RECOMMENDATIONS:  We discussed treatment options with the patient.  The patient
is continuing to have pain and discomfort.  We explained to her that we need to
have her case pre-certified by her insurance company.  When she returns to the
Pain Clinic, she would then undergo a right hip injection.  Risks and benefits
of the procedure were discussed.  At this point, the patient will be given a
Medrol Dosepak to take in the interim.  Hopefully, the insurance company will
pre-certify her for this procedure.  She will return, at which time, it will be
performed.
 
We would like to thank you for letting us participate in her care.  We hope she
continues to improve.
 
 
                         
   By:                               
                   
D: 02/15/19 0837                           _____________________________________
T: 02/15/19 2234                           LIBERTY Howard MD              /nt

## 2019-02-20 NOTE — NUR
Pain Clinic Assessment:
 
1. History of Osteoarthritis:
Not Applicable
   History of Rheumatoid Arthritis:
Not Applicable
 
2. Height: 5 ft. 4 in. 162.6 cm.
   Weight: 143.2 lb.  oz. 64.955 kg.
   Patient's BMI: 24.6
 
3. Vital Signs:
   BP: 108/67 Pulse: 71 Resp: 14
   Temp:  02 Sat: 95 ECG Mon:
 
4. Pain Intensity: 8-10
 
5. Fall Risk:
   Dizziness: N  Needs help standing or walking: N
   Fallen in the last 3 months: N
   Fall risk comments:
 
 
6. Patient on Blood Thinner: None
 
7. History of Hypertension: N
 
8. Opioid Therapy greater than 6 weeks: N
   Opiate Contract Signed:
 
9. Risk Assessment Tool Provided: 0-low
 
10. Functional Assessment Tool: 63/70
 
11. Recreational Drug Use: Never Drug Type:
    Tobacco Use: Never Smoker Tobacco Type:
       Amount or Packs/day:  How Many Years:
    Alcohol Use: Yes  Frequency:  Quant: